# Patient Record
Sex: FEMALE | Race: WHITE | NOT HISPANIC OR LATINO | Employment: FULL TIME | URBAN - METROPOLITAN AREA
[De-identification: names, ages, dates, MRNs, and addresses within clinical notes are randomized per-mention and may not be internally consistent; named-entity substitution may affect disease eponyms.]

---

## 2019-03-15 ENCOUNTER — OFFICE VISIT (OUTPATIENT)
Dept: FAMILY MEDICINE | Facility: CLINIC | Age: 44
End: 2019-03-15
Payer: COMMERCIAL

## 2019-03-15 VITALS
DIASTOLIC BLOOD PRESSURE: 74 MMHG | SYSTOLIC BLOOD PRESSURE: 102 MMHG | WEIGHT: 264 LBS | HEIGHT: 65 IN | BODY MASS INDEX: 43.99 KG/M2 | RESPIRATION RATE: 16 BRPM | OXYGEN SATURATION: 98 % | HEART RATE: 68 BPM | TEMPERATURE: 97.5 F

## 2019-03-15 DIAGNOSIS — Z00.00 PREVENTATIVE HEALTH CARE: Primary | ICD-10-CM

## 2019-03-15 DIAGNOSIS — J30.81 ALLERGIC RHINITIS DUE TO ANIMAL HAIR AND DANDER: ICD-10-CM

## 2019-03-15 DIAGNOSIS — E66.01 MORBID OBESITY (H): ICD-10-CM

## 2019-03-15 DIAGNOSIS — Z23 NEED FOR VACCINATION: ICD-10-CM

## 2019-03-15 DIAGNOSIS — E04.2 NON-TOXIC MULTINODULAR GOITER: ICD-10-CM

## 2019-03-15 DIAGNOSIS — Z98.84 HISTORY OF ROUX-EN-Y GASTRIC BYPASS: ICD-10-CM

## 2019-03-15 DIAGNOSIS — L30.9 ECZEMA, UNSPECIFIED TYPE: ICD-10-CM

## 2019-03-15 DIAGNOSIS — J45.40 MODERATE PERSISTENT ASTHMA WITHOUT COMPLICATION: ICD-10-CM

## 2019-03-15 LAB
ALBUMIN SERPL-MCNC: 3.7 G/DL (ref 3.4–5)
ALP SERPL-CCNC: 306 U/L (ref 40–150)
ALT SERPL W P-5'-P-CCNC: 43 U/L (ref 0–50)
ANION GAP SERPL CALCULATED.3IONS-SCNC: 6 MMOL/L (ref 3–14)
AST SERPL W P-5'-P-CCNC: 51 U/L (ref 0–45)
BILIRUB SERPL-MCNC: 0.3 MG/DL (ref 0.2–1.3)
BUN SERPL-MCNC: 7 MG/DL (ref 7–30)
CALCIUM SERPL-MCNC: 8.9 MG/DL (ref 8.5–10.1)
CHLORIDE SERPL-SCNC: 107 MMOL/L (ref 94–109)
CO2 SERPL-SCNC: 26 MMOL/L (ref 20–32)
CREAT SERPL-MCNC: 0.67 MG/DL (ref 0.52–1.04)
ERYTHROCYTE [DISTWIDTH] IN BLOOD BY AUTOMATED COUNT: 15.7 % (ref 10–15)
FOLATE SERPL-MCNC: 21.3 NG/ML
GFR SERPL CREATININE-BSD FRML MDRD: >90 ML/MIN/{1.73_M2}
GLUCOSE SERPL-MCNC: 94 MG/DL (ref 70–99)
HCT VFR BLD AUTO: 36.2 % (ref 35–47)
HGB BLD-MCNC: 11.8 G/DL (ref 11.7–15.7)
IRON SATN MFR SERPL: 11 % (ref 15–46)
IRON SERPL-MCNC: 48 UG/DL (ref 35–180)
MCH RBC QN AUTO: 26.5 PG (ref 26.5–33)
MCHC RBC AUTO-ENTMCNC: 32.6 G/DL (ref 31.5–36.5)
MCV RBC AUTO: 81 FL (ref 78–100)
PLATELET # BLD AUTO: 322 10E9/L (ref 150–450)
POTASSIUM SERPL-SCNC: 3.7 MMOL/L (ref 3.4–5.3)
PROT SERPL-MCNC: 7.6 G/DL (ref 6.8–8.8)
PTH-INTACT SERPL-MCNC: 126 PG/ML (ref 18–80)
RBC # BLD AUTO: 4.46 10E12/L (ref 3.8–5.2)
SODIUM SERPL-SCNC: 139 MMOL/L (ref 133–144)
TIBC SERPL-MCNC: 456 UG/DL (ref 240–430)
TSH SERPL DL<=0.005 MIU/L-ACNC: 0.93 MU/L (ref 0.4–4)
VIT B12 SERPL-MCNC: 313 PG/ML (ref 193–986)
WBC # BLD AUTO: 6.2 10E9/L (ref 4–11)

## 2019-03-15 PROCEDURE — 83540 ASSAY OF IRON: CPT | Performed by: FAMILY MEDICINE

## 2019-03-15 PROCEDURE — 83970 ASSAY OF PARATHORMONE: CPT | Performed by: FAMILY MEDICINE

## 2019-03-15 PROCEDURE — 82607 VITAMIN B-12: CPT | Performed by: FAMILY MEDICINE

## 2019-03-15 PROCEDURE — 36415 COLL VENOUS BLD VENIPUNCTURE: CPT | Performed by: FAMILY MEDICINE

## 2019-03-15 PROCEDURE — 80053 COMPREHEN METABOLIC PANEL: CPT | Performed by: FAMILY MEDICINE

## 2019-03-15 PROCEDURE — 85027 COMPLETE CBC AUTOMATED: CPT | Performed by: FAMILY MEDICINE

## 2019-03-15 PROCEDURE — 83550 IRON BINDING TEST: CPT | Performed by: FAMILY MEDICINE

## 2019-03-15 PROCEDURE — 99213 OFFICE O/P EST LOW 20 MIN: CPT | Mod: 25 | Performed by: FAMILY MEDICINE

## 2019-03-15 PROCEDURE — 99386 PREV VISIT NEW AGE 40-64: CPT | Mod: 25 | Performed by: FAMILY MEDICINE

## 2019-03-15 PROCEDURE — 90714 TD VACC NO PRESV 7 YRS+ IM: CPT | Performed by: FAMILY MEDICINE

## 2019-03-15 PROCEDURE — 90471 IMMUNIZATION ADMIN: CPT | Performed by: FAMILY MEDICINE

## 2019-03-15 PROCEDURE — 82746 ASSAY OF FOLIC ACID SERUM: CPT | Performed by: FAMILY MEDICINE

## 2019-03-15 PROCEDURE — 84443 ASSAY THYROID STIM HORMONE: CPT | Performed by: FAMILY MEDICINE

## 2019-03-15 PROCEDURE — 82306 VITAMIN D 25 HYDROXY: CPT | Performed by: FAMILY MEDICINE

## 2019-03-15 RX ORDER — BUDESONIDE AND FORMOTEROL FUMARATE DIHYDRATE 160; 4.5 UG/1; UG/1
2 AEROSOL RESPIRATORY (INHALATION) 2 TIMES DAILY
COMMUNITY

## 2019-03-15 ASSESSMENT — ENCOUNTER SYMPTOMS
PALPITATIONS: 0
MYALGIAS: 0
HEMATURIA: 0
FEVER: 0
EYE PAIN: 0
FREQUENCY: 0
HEARTBURN: 0
DYSURIA: 0
ARTHRALGIAS: 0
DIARRHEA: 0
COUGH: 0
WEAKNESS: 0
JOINT SWELLING: 0
SORE THROAT: 0
HEMATOCHEZIA: 0
BREAST MASS: 0
NERVOUS/ANXIOUS: 0
CONSTIPATION: 0
SHORTNESS OF BREATH: 0
NAUSEA: 0
PARESTHESIAS: 0
CHILLS: 0
ABDOMINAL PAIN: 0
HEADACHES: 0

## 2019-03-15 ASSESSMENT — PAIN SCALES - GENERAL: PAINLEVEL: NO PAIN (0)

## 2019-03-15 ASSESSMENT — MIFFLIN-ST. JEOR: SCORE: 1870.52

## 2019-03-15 NOTE — PROGRESS NOTES
SUBJECTIVE:   CC: Ruth Ann Farias is an 43 year old woman who presents for preventive health visit.   Chief Complaint   Patient presents with     Physical      Feels bloated lately.   Onset of symptoms: 6 months.   Has tried to cut down on pop this week.  Used to drink a lot of Diet Pepsi.  Cutting down to one daily has seemed to help.   No pain.  No heartburn.   She has had gastric bypass.   Loose stools.  Typical pattern is 1-2 stools daily.  Past 6 months.  Has not tried medications.     Wants to lose weight this year.     Physical   Annual:     Getting at least 3 servings of Calcium per day:  NO    Bi-annual eye exam:  Yes    Dental care twice a year:  Yes    Sleep apnea or symptoms of sleep apnea:  None    Diet:  Regular (no restrictions)    Frequency of exercise:  None    Taking medications regularly:  Yes    Additional concerns today:  Yes    PHQ-2 Total Score: 0  No exercise.  Plans to start walking.     Today's PHQ-2 Score:   PHQ-2 ( 1999 Pfizer) 3/15/2019   Q1: Little interest or pleasure in doing things 0   Q2: Feeling down, depressed or hopeless 0   PHQ-2 Score 0   Q1: Little interest or pleasure in doing things Not at all   Q2: Feeling down, depressed or hopeless Not at all   PHQ-2 Score 0     Abuse: Current or Past(Physical, Sexual or Emotional)- No  Do you feel safe in your environment? No    Social History     Tobacco Use     Smoking status: Never Smoker     Smokeless tobacco: Never Used   Substance Use Topics     Alcohol use: No     Alcohol Use 3/15/2019   If you drink alcohol do you typically have greater than 3 drinks per day OR greater than 7 drinks per week? Not Applicable       History of abnormal Pap smear: Status post benign hysterectomy. Health Maintenance and Surgical History updated.    There are no active problems to display for this patient.      History   Smoking Status     Never Smoker   Smokeless Tobacco     Never Used       Family history:  CV disease: no  Breast cancer: no  Colon  "cancer: no  Ovarian/uterine/cervical cancer: no    Multivitamin or Vit D use: no    Vaccines:due for tetanus    Pregnancy history: G-1, P-1  Contraception: na  LMP: 2005  Menses: na     No results found for: PAP  Past colon cancer screen:na  Past mammogram:no       Review of Systems   Constitutional: Negative for chills and fever.   HENT: Negative for congestion, ear pain, hearing loss and sore throat.    Eyes: Negative for pain and visual disturbance.   Respiratory: Negative for cough and shortness of breath.    Cardiovascular: Negative for chest pain, palpitations and peripheral edema.   Gastrointestinal: Negative for abdominal pain, constipation, diarrhea, heartburn, hematochezia and nausea.   Breasts:  Negative for tenderness, breast mass and discharge.   Genitourinary: Negative for dysuria, frequency, genital sores, hematuria, pelvic pain, urgency, vaginal bleeding and vaginal discharge.   Musculoskeletal: Negative for arthralgias, joint swelling and myalgias.   Skin: Negative for rash.   Neurological: Negative for weakness, headaches and paresthesias.   Psychiatric/Behavioral: Negative for mood changes. The patient is not nervous/anxious.    Physical Exam  Weight up in the past year.  She thinks up 40# in the past year. Top weight was 310#.  Lowest weight was 175#.  Was about at current weight right before her surgery.     OBJECTIVE:                                                    OBJECTIVE:Blood pressure 102/74, pulse 68, temperature 97.5  F (36.4  C), temperature source Tympanic, resp. rate 16, height 1.662 m (5' 5.45\"), weight 119.7 kg (264 lb), SpO2 98 %, not currently breastfeeding. BMI=Body mass index is 43.33 kg/m .  GENERAL APPEARANCE ADULT: Alert, no acute distress, morbidly obese  EYES: PERRL, EOM normal, conjunctiva and lids normal  HENT: Ears and TMs normal, oral mucosa and posterior oropharynx normal  NECK: No adenopathy,masses or thyromegaly  RESP: lungs clear to auscultation   CV: normal " rate, regular rhythm, no murmur or gallop  ABDOMEN: soft, no organomegaly, masses or tenderness  MS: extremities normal, no peripheral edema     ASSESSMENT/PLAN:                                                    Lifestyle recommendations:regular exercise, at least 150 minutes per week (average 30 minutes 5 times a week)  keep working on losing weight (ideal BMI-body mass index is <25)  The following exams/tests were recommended and discussed for health maintenance:  Colon cancer screening beginning at age 50 if at normal risk  Mammogram screening recommendations differ among expert groups.  They may be started at age 40 or 50.  Screening can be yearly or every other year depending upon a person's risk and preference.     (Z00.00) Preventative health care  (primary encounter diagnosis)    (Z98.84) History of Maikol-en-Y gastric bypass  Comment:   Plan: CBC with platelets, Parathyroid Hormone Intact,        Vitamin D Deficiency, Folate, Vitamin B12, Iron        and iron binding capacity, Comprehensive         metabolic panel (BMP + Alb, Alk Phos, ALT, AST,        Total. Bili, TP)        Check blood tests.    (E66.01) Morbid obesity (H)  Comment:   Plan: Let us know if you would like a referral for dietician or weight loss clinic.     (J30.81) Allergic rhinitis due to animal hair and dander  Comment: dogs and cats    (J45.40) Moderate persistent asthma without complication  Comment: doing OK.    Plan: Sees allergist.   Has been doing allergy shots.     (L30.9) Eczema, unspecified type    (E04.2) Non-toxic multinodular goiter  Comment:   Plan: TSH with free T4 reflex, US Thyroid        Blood test for thyroid.   Schedule an appointment with diagnostics for an ultrasound of thyroid.   Call [phone] to schedule.       COUNSELING:  Reviewed preventive health counseling, as reflected in patient instructions  Special attention given to:        Regular exercise    BP Readings from Last 1 Encounters:   12/12/15 104/73  "    Estimated body mass index is 37.84 kg/m  as calculated from the following:    Height as of 12/12/15: 1.626 m (5' 4\").    Weight as of 12/12/15: 100 kg (220 lb 7.4 oz).      Weight management plan: Discussed healthy diet and exercise guidelines     reports that  has never smoked. she has never used smokeless tobacco.      Counseling Resources:  ATP IV Guidelines  Pooled Cohorts Equation Calculator  Breast Cancer Risk Calculator  FRAX Risk Assessment  ICSI Preventive Guidelines  Dietary Guidelines for Americans, 2010  USDA's MyPlate  ASA Prophylaxis  Lung CA Screening    Avelino Mckeon MD  [facility]  "

## 2019-03-15 NOTE — PATIENT INSTRUCTIONS
Preventive Health Recommendations  Female Ages 40 to 49    Yearly exam:     See your health care provider every year in order to  1. Review health changes.   2. Discuss preventive care.    3. Review your medicines if your doctor prescribed any.      Get a Pap test every three years (unless you have an abnormal result and your provider advises testing more often).      If you get Pap tests with HPV test, you only need to test every 5 years, unless you have an abnormal result. You do not need a Pap test if your uterus was removed (hysterectomy) and you have not had cancer.      You should be tested each year for STDs (sexually transmitted diseases), if you're at risk.     Ask your doctor if you should have a mammogram.      Have a colonoscopy (test for colon cancer) if someone in your family has had colon cancer or polyps before age 50.       Have a cholesterol test every 5 years.       Have a diabetes test (fasting glucose) after age 45. If you are at risk for diabetes, you should have this test every 3 years.    Shots: Get a flu shot each year. Get a tetanus shot every 10 years.     Nutrition:     Eat at least 5 servings of fruits and vegetables each day.    Eat whole-grain bread, whole-wheat pasta and brown rice instead of white grains and rice.    Get adequate Calcium and Vitamin D.      Lifestyle    Exercise at least 150 minutes a week (an average of 30 minutes a day, 5 days a week). This will help you control your weight and prevent disease.    Limit alcohol to one drink per day.    No smoking.     Wear sunscreen to prevent skin cancer.    See your dentist every six months for an exam and cleaning.  Preventive Health Recommendations  Female Ages 40 to 49    Yearly exam:   See your health care provider every year in order to  Review health changes.   Discuss preventive care.    Review your medicines if your doctor prescribed any.    Get a Pap test every three years (unless you have an abnormal result and your  provider advises testing more often).    If you get Pap tests with HPV test, you only need to test every 5 years, unless you have an abnormal result. You do not need a Pap test if your uterus was removed (hysterectomy) and you have not had cancer.    You should be tested each year for STDs (sexually transmitted diseases), if you're at risk.   Ask your doctor if you should have a mammogram.    Have a colonoscopy (test for colon cancer) if someone in your family has had colon cancer or polyps before age 50.     Have a cholesterol test every 5 years.     Have a diabetes test (fasting glucose) after age 45. If you are at risk for diabetes, you should have this test every 3 years.    Shots: Get a flu shot each year. Get a tetanus shot every 10 years.     Nutrition:   Eat at least 5 servings of fruits and vegetables each day.  Eat whole-grain bread, whole-wheat pasta and brown rice instead of white grains and rice.  Get adequate Calcium and Vitamin D.      Lifestyle  Exercise at least 150 minutes a week (an average of 30 minutes a day, 5 days a week). This will help you control your weight and prevent disease.  Limit alcohol to one drink per day.  No smoking.   Wear sunscreen to prevent skin cancer.  See your dentist every six months for an exam and cleaning.    ASSESSMENT/PLAN:                                                    Lifestyle recommendations:regular exercise, at least 150 minutes per week (average 30 minutes 5 times a week)  keep working on losing weight (ideal BMI-body mass index is <25)  The following exams/tests were recommended and discussed for health maintenance:  Colon cancer screening beginning at age 50 if at normal risk  Mammogram screening recommendations differ among expert groups.  They may be started at age 40 or 50.  Screening can be yearly or every other year depending upon a person's risk and preference.     (Z00.00) Preventative health care  (primary encounter diagnosis)    (Z98.84) History  of Maikol-en-Y gastric bypass  Comment:   Plan: CBC with platelets, Parathyroid Hormone Intact,        Vitamin D Deficiency, Folate, Vitamin B12, Iron        and iron binding capacity, Comprehensive         metabolic panel (BMP + Alb, Alk Phos, ALT, AST,        Total. Bili, TP)        Check blood tests.    (E66.01) Morbid obesity (H)  Comment:   Plan: Let us know if you would like a referral for dietician or weight loss clinic.     (J30.81) Allergic rhinitis due to animal hair and dander  Comment: dogs and cats    (J45.40) Moderate persistent asthma without complication  Comment: doing OK.    Plan: Sees allergist.   Has been doing allergy shots.     (L30.9) Eczema, unspecified type    (E04.2) Non-toxic multinodular goiter  Comment:   Plan: TSH with free T4 reflex, US Thyroid        Blood test for thyroid.   Schedule an appointment with diagnostics for an ultrasound of thyroid.   Call [phone] to schedule.

## 2019-03-15 NOTE — LETTER
My Asthma Action Plan  Name: Ruth Ann Farias   YOB: 1975  Date: 3/18/2019   My doctor: Avelino Mckeon MD   My clinic: [facility]        My Control Medicine: Budesonide + formoterol (Symbicort) -  160/4.5 mcg 2 puffs twice daily  My Rescue Medicine: Albuterol (Proair/Ventolin/Proventil) inhaler 2 puffs every 6 hours as needed   My Asthma Severity: moderate persistent  Avoid your asthma triggers:                GREEN ZONE   Good Control    I feel good    No cough or wheeze    Can work, sleep and play without asthma symptoms       Take your asthma control medicine every day.     1. If exercise triggers your asthma, take your rescue medication    15 minutes before exercise or sports, and    During exercise if you have asthma symptoms  2. Spacer to use with inhaler: If you have a spacer, make sure to use it with your inhaler             YELLOW ZONE Getting Worse  I have ANY of these:    I do not feel good    Cough or wheeze    Chest feels tight    Wake up at night   1. Keep taking your Green Zone medications  2. Start taking your rescue medicine:    every 20 minutes for up to 1 hour. Then every 4 hours for 24-48 hours.  3. If you stay in the Yellow Zone for more than 12-24 hours, contact your doctor.  4. If you do not return to the Green Zone in 12-24 hours or you get worse, start taking your oral steroid medicine if prescribed by your provider.           RED ZONE Medical Alert - Get Help  I have ANY of these:    I feel awful    Medicine is not helping    Breathing getting harder    Trouble walking or talking    Nose opens wide to breathe       1. Take your rescue medicine NOW  2. If your provider has prescribed an oral steroid medicine, start taking it NOW  3. Call your doctor NOW  4. If you are still in the Red Zone after 20 minutes and you have not reached your doctor:    Take your rescue medicine again and    Call 911 or go to the emergency room right away    See your regular  doctor within 2 weeks of an Emergency Room or Urgent Care visit for follow-up treatment.          Annual Reminders:  Meet with Asthma Educator,  Flu Shot in the Fall, consider Pneumonia Vaccination for patients with asthma (aged 19 and older).    Pharmacy: [facility] - [place], [place] - [address]                      Asthma Triggers  How To Control Things That Make Your Asthma Worse    Triggers are things that make your asthma worse.  Look at the list below to help you find your triggers and what you can do about them.  You can help prevent asthma flare-ups by staying away from your triggers.      Trigger                                                          What you can do   Cigarette Smoke  Tobacco smoke can make asthma worse. Do not allow smoking in your home, car or around you.  Be sure no one smokes at a child s day care or school.  If you smoke, ask your health care provider for ways to help you quit.  Ask family members to quit too.  Ask your health care provider for a referral to Quit Plan to help you quit smoking, or call 0-384-775-PLAN.     Colds, Flu, Bronchitis  These are common triggers of asthma. Wash your hands often.  Don t touch your eyes, nose or mouth.  Get a flu shot every year.     Dust Mites  These are tiny bugs that live in cloth or carpet. They are too small to see. Wash sheets and blankets in hot water every week.   Encase pillows and mattress in dust mite proof covers.  Avoid having carpet if you can. If you have carpet, vacuum weekly.   Use a dust mask and HEPA vacuum.   Pollen and Outdoor Mold  Some people are allergic to trees, grass, or weed pollen, or molds. Try to keep your windows closed.  Limit time out doors when pollen count is high.   Ask you health care provider about taking medicine during allergy season.     Animal Dander  Some people are allergic to skin flakes, urine or saliva from pets with fur or feathers. Keep pets with fur or feathers out of your  home.    If you can t keep the pet outdoors, then keep the pet out of your bedroom.  Keep the bedroom door closed.  Keep pets off cloth furniture and away from stuffed toys.     Mice, Rats, and Cockroaches  Some people are allergic to the waste from these pests.   Cover food and garbage.  Clean up spills and food crumbs.  Store grease in the refrigerator.   Keep food out of the bedroom.   Indoor Mold  This can be a trigger if your home has high moisture. Fix leaking faucets, pipes, or other sources of water.   Clean moldy surfaces.  Dehumidify basement if it is damp and smelly.   Smoke, Strong Odors, and Sprays  These can reduce air quality. Stay away from strong odors and sprays, such as perfume, powder, hair spray, paints, smoke incense, paint, cleaning products, candles and new carpet.   Exercise or Sports  Some people with asthma have this trigger. Be active!  Ask your doctor about taking medicine before sports or exercise to prevent symptoms.    Warm up for 5-10 minutes before and after sports or exercise.     Other Triggers of Asthma  Cold air:  Cover your nose and mouth with a scarf.  Sometimes laughing or crying can be a trigger.  Some medicines and food can trigger asthma.

## 2019-03-16 ASSESSMENT — ASTHMA QUESTIONNAIRES: ACT_TOTALSCORE: 25

## 2019-03-17 LAB — DEPRECATED CALCIDIOL+CALCIFEROL SERPL-MC: 8 UG/L (ref 20–75)

## 2019-03-17 NOTE — RESULT ENCOUNTER NOTE
Please call.  Vit D is low. Parathyroid hormone high-likely from the Vit D deficiency.   B12 and folate tests are normal.   Mild changes in liver tests.   Uncertain cause.   TSH is normal indicating that the level of thyroid hormone is in the normal range.   Iron appears a little low.   PLAN: I recommend daily multivitamin with iron.  Also taking Vit D 800 to 1000IU daily.  Getting some sunshine can help Vit D level.    Schedule an appointment with diagnostics for the thyroid ultrasound as planned.   Call [phone] to schedule.   Recheck Vit D in 3-6 months.

## 2019-04-10 ENCOUNTER — HOSPITAL ENCOUNTER (OUTPATIENT)
Dept: ULTRASOUND IMAGING | Facility: CLINIC | Age: 44
Discharge: HOME | End: 2019-04-10
Attending: FAMILY MEDICINE | Admitting: FAMILY MEDICINE
Payer: COMMERCIAL

## 2019-04-10 DIAGNOSIS — E04.2 NON-TOXIC MULTINODULAR GOITER: ICD-10-CM

## 2019-04-10 PROCEDURE — 76536 US EXAM OF HEAD AND NECK: CPT

## 2019-04-10 NOTE — RESULT ENCOUNTER NOTE
Anton Vallecillo,   You have three small thyroid nodules which have not changed in comparison to 2010.  PLAN: No additional testing needed.    Recheck in a couple years.   NEDRA KOEHLER MD

## 2019-04-19 ENCOUNTER — HEALTH MAINTENANCE LETTER (OUTPATIENT)
Age: 44
End: 2019-04-19

## 2019-07-10 ENCOUNTER — TELEPHONE (OUTPATIENT)
Dept: FAMILY MEDICINE | Facility: CLINIC | Age: 44
End: 2019-07-10

## 2019-07-10 NOTE — TELEPHONE ENCOUNTER
Panel Management Review      Patient has the following on her problem list:     Asthma review     ACT Total Scores 3/15/2019   ACT TOTAL SCORE (Goal Greater than or Equal to 20) 25   In the past 12 months, how many times did you visit the emergency room for your asthma without being admitted to the hospital? 0   In the past 12 months, how many times were you hospitalized overnight because of your asthma? 0      1. Is Asthma diagnosis on the Problem List? Yes    2. Is Asthma listed on Health Maintenance? Yes    3. Patient is due for:  none      Composite cancer screening  Chart review shows that this patient is due/due soon for the following Pap Smear  Summary:    Patient is due/failing the following:   Pap no longer needed    Action needed:   See above    Type of outreach:    chart updated- patient has had hysterectomy.    Questions for provider review:                                                                                                                                        Christine Buckley CMA       Chart routed to  .

## 2019-08-15 ENCOUNTER — OFFICE VISIT (OUTPATIENT)
Dept: FAMILY MEDICINE | Facility: CLINIC | Age: 44
End: 2019-08-15
Payer: COMMERCIAL

## 2019-08-15 VITALS
HEIGHT: 65 IN | TEMPERATURE: 98.2 F | WEIGHT: 244 LBS | BODY MASS INDEX: 40.65 KG/M2 | SYSTOLIC BLOOD PRESSURE: 108 MMHG | DIASTOLIC BLOOD PRESSURE: 74 MMHG | RESPIRATION RATE: 16 BRPM | OXYGEN SATURATION: 98 % | HEART RATE: 89 BPM

## 2019-08-15 DIAGNOSIS — R35.0 URINARY FREQUENCY: ICD-10-CM

## 2019-08-15 DIAGNOSIS — R82.90 NONSPECIFIC FINDING ON EXAMINATION OF URINE: ICD-10-CM

## 2019-08-15 DIAGNOSIS — N30.01 ACUTE CYSTITIS WITH HEMATURIA: Primary | ICD-10-CM

## 2019-08-15 LAB
ALBUMIN UR-MCNC: NEGATIVE MG/DL
APPEARANCE UR: CLEAR
BACTERIA #/AREA URNS HPF: ABNORMAL /HPF
BILIRUB UR QL STRIP: NEGATIVE
COLOR UR AUTO: YELLOW
GLUCOSE UR STRIP-MCNC: NEGATIVE MG/DL
HGB UR QL STRIP: ABNORMAL
KETONES UR STRIP-MCNC: NEGATIVE MG/DL
LEUKOCYTE ESTERASE UR QL STRIP: ABNORMAL
NITRATE UR QL: NEGATIVE
PH UR STRIP: 6.5 PH (ref 5–7)
RBC #/AREA URNS AUTO: ABNORMAL /HPF
SOURCE: ABNORMAL
SP GR UR STRIP: <=1.005 (ref 1–1.03)
UROBILINOGEN UR STRIP-ACNC: 0.2 EU/DL (ref 0.2–1)
WBC #/AREA URNS AUTO: ABNORMAL /HPF
WBC CLUMPS #/AREA URNS HPF: PRESENT /HPF

## 2019-08-15 PROCEDURE — 81001 URINALYSIS AUTO W/SCOPE: CPT | Performed by: NURSE PRACTITIONER

## 2019-08-15 PROCEDURE — 87088 URINE BACTERIA CULTURE: CPT | Performed by: NURSE PRACTITIONER

## 2019-08-15 PROCEDURE — 87186 SC STD MICRODIL/AGAR DIL: CPT | Performed by: NURSE PRACTITIONER

## 2019-08-15 PROCEDURE — 99213 OFFICE O/P EST LOW 20 MIN: CPT | Performed by: NURSE PRACTITIONER

## 2019-08-15 PROCEDURE — 87086 URINE CULTURE/COLONY COUNT: CPT | Performed by: NURSE PRACTITIONER

## 2019-08-15 RX ORDER — SULFAMETHOXAZOLE/TRIMETHOPRIM 800-160 MG
1 TABLET ORAL 2 TIMES DAILY
Qty: 20 TABLET | Refills: 0 | Status: SHIPPED | OUTPATIENT
Start: 2019-08-15 | End: 2019-08-25

## 2019-08-15 ASSESSMENT — MIFFLIN-ST. JEOR: SCORE: 1774.8

## 2019-08-15 NOTE — PATIENT INSTRUCTIONS
"Antibiotics as directed   Urine culture is pending, will contact you if there is a change in treatment therapy.   Drink plenty of fluids. Prevention and treatment of UTI's discussed.   Signs and symptoms of pyelonephritis mentioned.   RTC if any worsening symptoms or if not improving as expected.     Patient Education     Bladder Infection, Female (Adult)    Urine is normally doesn't have any bacteria in it. But bacteria can get into the urinary tract from the skin around the rectum. Or they can travel in the blood from elsewhere in the body. Once they are in your urinary tract, they can cause infection in the urethra (urethritis), the bladder (cystitis), or the kidneys (pyelonephritis).  The most common place for an infection is in the bladder. This is called a bladder infection. This is one of the most common infections in women. Most bladder infections are easily treated. They are not serious unless the infection spreads to the kidney.  The phrases \"bladder infection,\" \"UTI,\" and \"cystitis\" are often used to describe the same thing. But they are not always the same. Cystitis is an inflammation of the bladder. The most common cause of cystitis is an infection.  Symptoms  The infection causes inflammation in the urethra and bladder. This causes many of the symptoms. The most common symptoms of a bladder infection are:    Pain or burning when urinating    Having to urinate more often than usual    Urgent need to urinate    Only a small amount of urine comes out    Blood in urine    Abdominal discomfort. This is usually in the lower abdomen above the pubic bone.    Cloudy urine    Strong- or bad-smelling urine    Unable to urinate (urinary retention)    Unable to hold urine in (urinary incontinence)    Fever    Loss of appetite    Confusion (in older adults)  Causes  Bladder infections are not contagious. You can't get one from someone else, from a toilet seat, or from sharing a bath.  The most common cause of " bladder infections is bacteria from the bowels. The bacteria get onto the skin around the opening of the urethra. From there, they can get into the urine and travel up to the bladder, causing inflammation and infection. This usually happens because of:    Wiping improperly after urinating. Always wipe from front to back.    Bowel incontinence    Pregnancy    Procedures such as having a catheter inserted    Older age    Not emptying your bladder. This can allow bacteria a chance to grow in your urine.    Dehydration    Constipation    Sex    Use of a diaphragm for birth control   Treatment  Bladder infections are diagnosed by a urine test. They are treated with antibiotics and usually clear up quickly without complications. Treatment helps prevent a more serious kidney infection.  Medicines  Medicines can help in the treatment of a bladder infection:    Take antibiotics until they are used up, even if you feel better. It is important to finish them to make sure the infection has cleared.    You can use acetaminophen or ibuprofen for pain, fever, or discomfort, unless another medicine was prescribed. If you have chronic liver or kidney disease, talk with your healthcare provider before using these medicines. Also talk with your provider if you've ever had a stomach ulcer or gastrointestinal bleeding, or are taking blood-thinner medicines.    If you are given phenazopydridine to reduce burning with urination, it will cause your urine to become a bright orange color. This can stain clothing.  Care and prevention  These self-care steps can help prevent future infections:    Drink plenty of fluids to prevent dehydration and flush out your bladder. Do this unless you must restrict fluids for other health reasons, or your doctor told you not to.    Proper cleaning after going to the bathroom is important. Wipe from front to back after using the toilet to prevent the spread of bacteria.    Urinate more often. Don't try to  hold urine in for a long time.    Wear loose-fitting clothes and cotton underwear. Avoid tight-fitting pants.    Improve your diet and prevent constipation. Eat more fresh fruit and vegetables, and fiber, and less junk and fatty foods.    Avoid sex until your symptoms are gone.    Avoid caffeine, alcohol, and spicy foods. These can irritate your bladder.    Urinate right after intercourse to flush out your bladder.    If you use birth control pills and have frequent bladder infections, discuss it with your doctor.  Follow-up care  Call your healthcare provider if all symptoms are not gone after 3 days of treatment. This is especially important if you have repeat infections.  If a culture was done, you will be told if your treatment needs to be changed. If directed, you can call to find out the results.  If X-rays were done, you will be told if the results will affect your treatment.  Call 911  Call 911 if any of the following occur:    Trouble breathing    Hard to wake up or confusion    Fainting or loss of consciousness    Rapid heart rate  When to seek medical advice  Call your healthcare provider right away if any of these occur:    Fever of 100.4 F (38.0 C) or higher, or as directed by your healthcare provider    Symptoms are not better by the third day of treatment    Back or belly (abdominal) pain that gets worse    Repeated vomiting, or unable to keep medicine down    Weakness or dizziness    Vaginal discharge    Pain, redness, or swelling in the outer vaginal area (labia)  Date Last Reviewed: 10/1/2016    2423-3413 [employer]. [address] [place] [address], [place] [address]. All rights reserved. This information is not intended as a substitute for professional medical care. Always follow your healthcare professional's instructions.

## 2019-08-15 NOTE — NURSING NOTE
"Chief Complaint   Patient presents with     UTI       Initial /74   Pulse 89   Temp 98.2  F (36.8  C) (Tympanic)   Resp 16   Ht 1.662 m (5' 5.45\")   Wt 110.7 kg (244 lb)   SpO2 98%   BMI 40.05 kg/m   Estimated body mass index is 40.05 kg/m  as calculated from the following:    Height as of this encounter: 1.662 m (5' 5.45\").    Weight as of this encounter: 110.7 kg (244 lb).    Patient presents to the clinic using No DME    Health Maintenance that is potentially due pending provider review:  NONE    n/a    Is there anyone who you would like to be able to receive your results? No  If yes have patient fill out RONDA      " Paged with correct number but incorrect fun number.    Patient reports that she has been on antibiotics for a prolonged course.    Developed itching in vulva but no discharge yet.    Wanted advice on how to proceed, she suspects a yeast infection.    Advised she can try OTC monistat but if would like to be evaluated, to come to [facility] today or [facility] tomorrow.  Reviewed other possible etiologies and how a vaginal swab could assist in diagnosis.    Patient agreeable to plan.

## 2019-08-15 NOTE — PROGRESS NOTES
Subjective     Ruth Ann Farias is a 44 year old female who presents to clinic today for the following health issues:    HPI   URINARY TRACT SYMPTOMS      Duration: yesterday     Description  dysuria, frequency and hematuria    Intensity:  moderate    Accompanying signs and symptoms:  Fever/chills: no   Flank pain no   Nausea and vomiting: no   Vaginal symptoms: none  Abdominal/Pelvic Pain: no     History  History of frequent UTI's: YES- used to get them five times a year. Has not had a UTI in 2 years. Seen urology in the past.   History of kidney stones: no   Sexually Active: YES  Possibility of pregnancy: No    Precipitating or alleviating factors: None    Therapies tried and outcome: none           Patient Active Problem List   Diagnosis     History of Maikol-en-Y gastric bypass     Morbid obesity (H)     Allergic rhinitis due to animal hair and dander     Non-toxic multinodular goiter     Moderate persistent asthma without complication     Eczema, unspecified type     Past Surgical History:   Procedure Laterality Date     GASTRIC BYPASS  2005     HYSTERECTOMY      dysmenorrhea and neavy periods.         Social History     Tobacco Use     Smoking status: Never Smoker     Smokeless tobacco: Never Used   Substance Use Topics     Alcohol use: No     Family History   Problem Relation Age of Onset     Diabetes Mother 60        passed from diabetic complecations     Mental Illness Mother      Obesity Mother      Obesity Father      Obesity Maternal Grandmother      Obesity Maternal Grandfather      Heart Disease Sister      Thyroid Disease Sister      Obesity Sister      Obesity Daughter      Colon Cancer No family hx of      Breast Cancer No family hx of      Coronary Artery Disease No family hx of          Current Outpatient Medications   Medication Sig Dispense Refill     albuterol (PROAIR HFA, PROVENTIL HFA, VENTOLIN HFA) 108 (90 BASE) MCG/ACT inhaler Inhale 2 puffs into the lungs every 6 hours        "budesonide-formoterol (SYMBICORT) 160-4.5 MCG/ACT Inhaler Inhale 2 puffs into the lungs 2 times daily       sulfamethoxazole-trimethoprim (BACTRIM DS/SEPTRA DS) 800-160 MG tablet Take 1 tablet by mouth 2 times daily for 10 days 20 tablet 0     Allergies   Allergen Reactions     Nitrofurantoin Difficulty breathing         Reviewed and updated as needed this visit by Provider  Tobacco  Allergies  Meds  Problems  Med Hx  Surg Hx  Fam Hx         Review of Systems   ROS COMP: Constitutional, HEENT, cardiovascular, pulmonary, GI, , musculoskeletal, neuro, skin, endocrine and psych systems are negative, except as otherwise noted.      Objective    /74   Pulse 89   Temp 98.2  F (36.8  C) (Tympanic)   Resp 16   Ht 1.662 m (5' 5.45\")   Wt 110.7 kg (244 lb)   SpO2 98%   BMI 40.05 kg/m    Body mass index is 40.05 kg/m .  Physical Exam   GENERAL: healthy, alert and no distress, nontoxic in appearance  EYES: Eyes grossly normal to inspection, PERRL and conjunctivae and sclerae normal  HENT: normocephalic  NECK:supple with full ROM  RESP: lungs clear to auscultation - no rales, rhonchi or wheezes  CV: regular rate and rhythm, normal S1 S2, no S3 or S4, no murmur, click or rub, no peripheral edema   ABDOMEN: soft, mild suprapubic tenderness, no hepatosplenomegaly, no masses and bowel sounds normal  MS: no gross musculoskeletal defects noted, no edema  Neb CVA tenderness. No rash    Diagnostic Test Results:  Labs reviewed in Epic  Results for orders placed or performed in visit on 08/15/19 (from the past 24 hour(s))   *UA reflex to Microscopic and Culture ([place] [facility] (except [place] and [place])   Result Value Ref Range    Color Urine Yellow     Appearance Urine Clear     Glucose Urine Negative NEG^Negative mg/dL    Bilirubin Urine Negative NEG^Negative    Ketones Urine Negative NEG^Negative mg/dL    Specific Gravity Urine <=1.005 1.003 - 1.035    Blood Urine Large (A) NEG^Negative    pH " "Urine 6.5 5.0 - 7.0 pH    Protein Albumin Urine Negative NEG^Negative mg/dL    Urobilinogen Urine 0.2 0.2 - 1.0 EU/dL    Nitrite Urine Negative NEG^Negative    Leukocyte Esterase Urine Moderate (A) NEG^Negative    Source Midstream Urine    Urine Microscopic   Result Value Ref Range    WBC Urine 10-25 (A) OTO5^0 - 5 /HPF    RBC Urine 2-5 (A) OTO2^O - 2 /HPF    WBC Clumps Present (A) NEG^Negative /HPF    Bacteria Urine Moderate (A) NEG^Negative /HPF           Assessment & Plan   Problem List Items Addressed This Visit     None      Visit Diagnoses     Acute cystitis with hematuria    -  Primary    Relevant Medications    sulfamethoxazole-trimethoprim (BACTRIM DS/SEPTRA DS) 800-160 MG tablet    Urinary frequency        Relevant Orders    *UA reflex to Microscopic and Culture ([place] [facility] (except [place] and [place]) (Completed)    Urine Microscopic (Completed)    Nonspecific finding on examination of urine        Relevant Orders    Urine Culture Aerobic Bacterial (Completed)             BMI:   Estimated body mass index is 40.05 kg/m  as calculated from the following:    Height as of this encounter: 1.662 m (5' 5.45\").    Weight as of this encounter: 110.7 kg (244 lb).   Weight management plan: Patient was referred to their PCP to discuss a diet and exercise plan.        Patient Instructions   Antibiotics as directed   Urine culture is pending, will contact you if there is a change in treatment therapy.   Drink plenty of fluids. Prevention and treatment of UTI's discussed.   Signs and symptoms of pyelonephritis mentioned.   RTC if any worsening symptoms or if not improving as expected.     Patient Education     Bladder Infection, Female (Adult)    Urine is normally doesn't have any bacteria in it. But bacteria can get into the urinary tract from the skin around the rectum. Or they can travel in the blood from elsewhere in the body. Once they are in your urinary tract, they can cause infection in the " "urethra (urethritis), the bladder (cystitis), or the kidneys (pyelonephritis).  The most common place for an infection is in the bladder. This is called a bladder infection. This is one of the most common infections in women. Most bladder infections are easily treated. They are not serious unless the infection spreads to the kidney.  The phrases \"bladder infection,\" \"UTI,\" and \"cystitis\" are often used to describe the same thing. But they are not always the same. Cystitis is an inflammation of the bladder. The most common cause of cystitis is an infection.  Symptoms  The infection causes inflammation in the urethra and bladder. This causes many of the symptoms. The most common symptoms of a bladder infection are:    Pain or burning when urinating    Having to urinate more often than usual    Urgent need to urinate    Only a small amount of urine comes out    Blood in urine    Abdominal discomfort. This is usually in the lower abdomen above the pubic bone.    Cloudy urine    Strong- or bad-smelling urine    Unable to urinate (urinary retention)    Unable to hold urine in (urinary incontinence)    Fever    Loss of appetite    Confusion (in older adults)  Causes  Bladder infections are not contagious. You can't get one from someone else, from a toilet seat, or from sharing a bath.  The most common cause of bladder infections is bacteria from the bowels. The bacteria get onto the skin around the opening of the urethra. From there, they can get into the urine and travel up to the bladder, causing inflammation and infection. This usually happens because of:    Wiping improperly after urinating. Always wipe from front to back.    Bowel incontinence    Pregnancy    Procedures such as having a catheter inserted    Older age    Not emptying your bladder. This can allow bacteria a chance to grow in your urine.    Dehydration    Constipation    Sex    Use of a diaphragm for birth control   Treatment  Bladder infections are " diagnosed by a urine test. They are treated with antibiotics and usually clear up quickly without complications. Treatment helps prevent a more serious kidney infection.  Medicines  Medicines can help in the treatment of a bladder infection:    Take antibiotics until they are used up, even if you feel better. It is important to finish them to make sure the infection has cleared.    You can use acetaminophen or ibuprofen for pain, fever, or discomfort, unless another medicine was prescribed. If you have chronic liver or kidney disease, talk with your healthcare provider before using these medicines. Also talk with your provider if you've ever had a stomach ulcer or gastrointestinal bleeding, or are taking blood-thinner medicines.    If you are given phenazopydridine to reduce burning with urination, it will cause your urine to become a bright orange color. This can stain clothing.  Care and prevention  These self-care steps can help prevent future infections:    Drink plenty of fluids to prevent dehydration and flush out your bladder. Do this unless you must restrict fluids for other health reasons, or your doctor told you not to.    Proper cleaning after going to the bathroom is important. Wipe from front to back after using the toilet to prevent the spread of bacteria.    Urinate more often. Don't try to hold urine in for a long time.    Wear loose-fitting clothes and cotton underwear. Avoid tight-fitting pants.    Improve your diet and prevent constipation. Eat more fresh fruit and vegetables, and fiber, and less junk and fatty foods.    Avoid sex until your symptoms are gone.    Avoid caffeine, alcohol, and spicy foods. These can irritate your bladder.    Urinate right after intercourse to flush out your bladder.    If you use birth control pills and have frequent bladder infections, discuss it with your doctor.  Follow-up care  Call your healthcare provider if all symptoms are not gone after 3 days of treatment.  This is especially important if you have repeat infections.  If a culture was done, you will be told if your treatment needs to be changed. If directed, you can call to find out the results.  If X-rays were done, you will be told if the results will affect your treatment.  Call 911  Call 911 if any of the following occur:    Trouble breathing    Hard to wake up or confusion    Fainting or loss of consciousness    Rapid heart rate  When to seek medical advice  Call your healthcare provider right away if any of these occur:    Fever of 100.4 F (38.0 C) or higher, or as directed by your healthcare provider    Symptoms are not better by the third day of treatment    Back or belly (abdominal) pain that gets worse    Repeated vomiting, or unable to keep medicine down    Weakness or dizziness    Vaginal discharge    Pain, redness, or swelling in the outer vaginal area (labia)  Date Last Reviewed: 10/1/2016    0636-9254 [employer]. [address] [place] [address]. All rights reserved. This information is not intended as a substitute for professional medical care. Always follow your healthcare professional's instructions.             Return in about 10 days (around 8/25/2019), or if symptoms worsen or fail to improve, for Follow up with your primary care provider.    DEJAN Agarwal [facility]

## 2019-08-16 LAB
BACTERIA SPEC CULT: ABNORMAL
Lab: ABNORMAL
SPECIMEN SOURCE: ABNORMAL

## 2019-11-03 ENCOUNTER — HEALTH MAINTENANCE LETTER (OUTPATIENT)
Age: 44
End: 2019-11-03

## 2020-04-17 ENCOUNTER — TELEPHONE (OUTPATIENT)
Dept: FAMILY MEDICINE | Facility: CLINIC | Age: 45
End: 2020-04-17

## 2020-04-17 NOTE — LETTER
My Asthma Action Plan    Name: Ruth Ann Farias   YOB: 1975  Date: 4/17/2020   My doctor: Avelino Mckeon MD   My clinic: [facility]        My Control Medicine: Budesonide + formoterol (Symbicort HFA) -  160/4.5 mcg 2 puffs twice daily  My Rescue Medicine: Albuterol (Proair/Ventolin/Proventil HFA) 2-4 puffs EVERY 4 HOURS as needed. Use a spacer if recommended by your provider.   My Asthma Severity:   Moderate Persistent  Know your asthma triggers:                GREEN ZONE   Good Control    I feel good    No cough or wheeze    Can work, sleep and play without asthma symptoms       Take your asthma control medicine every day.     1. If exercise triggers your asthma, take your rescue medication    15 minutes before exercise or sports, and    During exercise if you have asthma symptoms  2. Spacer to use with inhaler: If you have a spacer, make sure to use it with your inhaler             YELLOW ZONE Getting Worse  I have ANY of these:    I do not feel good    Cough or wheeze    Chest feels tight    Wake up at night   1. Keep taking your Green Zone medications  2. Start taking your rescue medicine:    every 20 minutes for up to 1 hour. Then every 4 hours for 24-48 hours.  3. If you stay in the Yellow Zone for more than 12-24 hours, contact your doctor.  4. If you do not return to the Green Zone in 12-24 hours or you get worse, start taking your oral steroid medicine if prescribed by your provider.           RED ZONE Medical Alert - Get Help  I have ANY of these:    I feel awful    Medicine is not helping    Breathing getting harder    Trouble walking or talking    Nose opens wide to breathe       1. Take your rescue medicine NOW  2. If your provider has prescribed an oral steroid medicine, start taking it NOW  3. Call your doctor NOW  4. If you are still in the Red Zone after 20 minutes and you have not reached your doctor:    Take your rescue medicine again and    Call 911 or go to the  emergency room right away    See your regular doctor within 2 weeks of an Emergency Room or Urgent Care visit for follow-up treatment.          Annual Reminders:  Meet with Asthma Educator,  Flu Shot in the Fall, consider Pneumonia Vaccination for patients with asthma (aged 19 and older).    Pharmacy: [facility] [place], [place] - [address]    Electronically signed by Avelino Mckeon MD   Date: 04/17/20                      Asthma Triggers  How To Control Things That Make Your Asthma Worse    Triggers are things that make your asthma worse.  Look at the list below to help you find your triggers and what you can do about them.  You can help prevent asthma flare-ups by staying away from your triggers.      Trigger                                                          What you can do   Cigarette Smoke  Tobacco smoke can make asthma worse. Do not allow smoking in your home, car or around you.  Be sure no one smokes at a child s day care or school.  If you smoke, ask your health care provider for ways to help you quit.  Ask family members to quit too.  Ask your health care provider for a referral to Quit Plan to help you quit smoking, or call 0-568-051-PLAN.     Colds, Flu, Bronchitis  These are common triggers of asthma. Wash your hands often.  Don t touch your eyes, nose or mouth.  Get a flu shot every year.     Dust Mites  These are tiny bugs that live in cloth or carpet. They are too small to see. Wash sheets and blankets in hot water every week.   Encase pillows and mattress in dust mite proof covers.  Avoid having carpet if you can. If you have carpet, vacuum weekly.   Use a dust mask and HEPA vacuum.   Pollen and Outdoor Mold  Some people are allergic to trees, grass, or weed pollen, or molds. Try to keep your windows closed.  Limit time out doors when pollen count is high.   Ask you health care provider about taking medicine during allergy season.     Animal Dander  Some people are  allergic to skin flakes, urine or saliva from pets with fur or feathers. Keep pets with fur or feathers out of your home.    If you can t keep the pet outdoors, then keep the pet out of your bedroom.  Keep the bedroom door closed.  Keep pets off cloth furniture and away from stuffed toys.     Mice, Rats, and Cockroaches   Some people are allergic to the waste from these pests.   Cover food and garbage.  Clean up spills and food crumbs.  Store grease in the refrigerator.   Keep food out of the bedroom.   Indoor Mold  This can be a trigger if your home has high moisture. Fix leaking faucets, pipes, or other sources of water.   Clean moldy surfaces.  Dehumidify basement if it is damp and smelly.   Smoke, Strong Odors, and Sprays  These can reduce air quality. Stay away from strong odors and sprays, such as perfume, powder, hair spray, paints, smoke incense, paint, cleaning products, candles and new carpet.   Exercise or Sports  Some people with asthma have this trigger. Be active!  Ask your doctor about taking medicine before sports or exercise to prevent symptoms.    Warm up for 5-10 minutes before and after sports or exercise.     Other Triggers of Asthma  Cold air:  Cover your nose and mouth with a scarf.  Sometimes laughing or crying can be a trigger.  Some medicines and food can trigger asthma.

## 2020-04-17 NOTE — TELEPHONE ENCOUNTER
Panel Management Review      Patient has the following on her problem list:     Asthma review     ACT Total Scores 4/17/2020   ACT TOTAL SCORE (Goal Greater than or Equal to 20) 24   In the past 12 months, how many times did you visit the emergency room for your asthma without being admitted to the hospital? 0   In the past 12 months, how many times were you hospitalized overnight because of your asthma? 0      1. Is Asthma diagnosis on the Problem List? Yes    2. Is Asthma listed on Health Maintenance? Yes    3. Patient is due for:  ACT and AAP      Composite cancer screening  Chart review shows that this patient is due/due soon for the following   Summary:    Patient is due/failing the following:   AAP and ACT    Action needed:   Patient needs to do ACT. Patient had copy of ACT- so completed over the phone.  Patient has seen allergist and Ruben jewell in the past and currently not taking Symbicort everyday.  I suggested she reach out to her doctor and ask about this, especially with COVID concerns.   Type of outreach:    Phone, spoke to patient.  see above    Questions for provider review:    None                                                                                                                                    Christine Buckley CMA       Chart routed to  .

## 2020-04-18 ASSESSMENT — ASTHMA QUESTIONNAIRES: ACT_TOTALSCORE: 24

## 2020-11-16 ENCOUNTER — HEALTH MAINTENANCE LETTER (OUTPATIENT)
Age: 45
End: 2020-11-16

## 2021-05-28 ENCOUNTER — RECORDS - HEALTHEAST (OUTPATIENT)
Dept: ADMINISTRATIVE | Facility: CLINIC | Age: 46
End: 2021-05-28

## 2021-06-14 ENCOUNTER — MEDICAL CORRESPONDENCE (OUTPATIENT)
Dept: HEALTH INFORMATION MANAGEMENT | Facility: CLINIC | Age: 46
End: 2021-06-14

## 2021-07-12 ENCOUNTER — HOSPITAL ENCOUNTER (OUTPATIENT)
Dept: PHYSICAL THERAPY | Facility: CLINIC | Age: 46
Setting detail: THERAPIES SERIES
End: 2021-07-12
Attending: DENTIST
Payer: COMMERCIAL

## 2021-07-12 PROCEDURE — 97110 THERAPEUTIC EXERCISES: CPT | Mod: GP | Performed by: PHYSICAL THERAPIST

## 2021-07-12 PROCEDURE — 97033 APP MDLTY 1+IONTPHRSIS EA 15: CPT | Mod: GP | Performed by: PHYSICAL THERAPIST

## 2021-07-12 PROCEDURE — 97162 PT EVAL MOD COMPLEX 30 MIN: CPT | Mod: GP | Performed by: PHYSICAL THERAPIST

## 2021-07-12 NOTE — PROGRESS NOTES
TMJ Evaluation 07/12/21 0800   General Information   Type of Visit Initial OP Ortho PT Evaluation   Start of Care Date 07/12/21   Referring Physician Ray Feldman    Patient/Family Goals Statement MTUS [place]   Orders Evaluate and Treat   Orders Comment Modalities as needed. Return after 4 if no change.    Date of Order 06/14/21   Certification Required? No  (BCBS - Auth'd )   Medical Diagnosis B TMJ Internal Derangement,  L TMJ Pain.    Surgical/Medical history reviewed   (Asthma, Gastric Bypass, Hysterectomy )   Precautions/Limitations   (Softer diet. )   Body Part(s)   Body Part(s) TMJ   Presentation and Etiology   Pertinent history of current problem (include personal factors and/or comorbidities that impact the POC) Long appointment for dental work, had something in mouth to help w/ opening, and was fighting it during that time. Was in November of 2020.    Impairments A. Pain;M. Locking or catching   Functional Limitations Other  (Oral functions, Eating, Yawning. )   Symptom Location Pain L in front of ear that shoots into ear. No HA 's. Catches when opening and has to force it open.   How/Where did it occur Other  (Dental )   Onset date of current episode/exacerbation 06/14/21  (MD order date, but dental appointment was in November 2020 )   Chronicity Chronic   Pain rating (0-10 point scale)   (2-4/10 )   Pain quality A. Sharp;B. Dull   Frequency of pain/symptoms C. With activity   Pain/symptoms are: Worse during the day  (Later in day. )   Pain/symptoms exacerbated by M. Other   Pain exacerbation comment EAting, Yawning   Pain/symptoms eased by C. Rest   Progression of symptoms since onset: Improved   Prior Level of Function   Functional Level Prior Comment In[employer] Jet ingram   Current Level of Function   Current Community Support Family/friend caregiver   Patient role/employment history A. Employed   Employment Comments [employer]   [facility] [place]   Fall Risk Screen    Fall screen completed by PT   Have you fallen 2 or more times in the past year? No   Have you fallen and had an injury in the past year? No   Timed Up and Go score (seconds) No balance issues and walks quickly into dept.    Is patient a fall risk? No   Abuse Screen (yes response referral indicated)   Feels Unsafe at Home or Work/School no   Feels Threatened by Someone no   Does Anyone Try to Keep You From Having Contact with Others or Doing Things Outside Your Home? no   Physical Signs of Abuse Present no   System Outcome Measures   Outcome Measures   (TMJ FUnctional scale scanned into chart. )   Functional Scales   Functional Scales OPTIMAL   Optimal (1=able to do without difficulty, 2=able to do with little difficulty, 3=able to do with moderate difficulty, 4=able to do with much difficulty, 5=unable to do, 9=NA) Activity 1;Activity 3;Activity 2   Activity 1 comment Yawning 8/10 on TMJ Scale.    Activity 2 comment Opening Wide enought to bite into sandwich 9/10 on TMJ Scal.e    Activity 3 comment Chewing Hard bread 8/10 on TMJ functional scale.    TMJ Objective Findings   Observation No acute distress    Integumentary  Normal    Posture head forward, Flat thoracic back, rounded shoulders.    Joint noises Yes with pain   Joint lock Closed   Pain Yawning;Chewing;Opening;Brushing, flossing   Associated symptoms Earache   Headache None   Habits Unilateral chewing;Caffeine   Tongue position TATU taught today.    Difficulty swallowing No   Muscal Ridging Yes   Tongue Scalloping No   Accelerated Tooth Wear No   Overjet (mm) 2   Overbite (mm) 3   Intraoral mouth appliance No   Bite/Occlusion Bite does not feel off   Major dental work Yes   Tired or painful jaw muscles Yes   Opening click Yes   Closing click Yes   Crepitus No   Subluxation No   Early translation Yes  (L>R )   Passive testing - Distraction Hypomobile  (Bilateral )   TMJ ROM Mandible Opening;Mandible Protrusion;Left Laterotrusion;Right Laterotrusion   TMJ  ROM Comments Tight SC space, Slight deviation during opening    Palpation   (No tender of Muscles, Scalenes Tight B, No capsule tendernes)   Dental work comment Last in May of 2021.    Opening click comment End range but range very limited.    Mandible Opening 15   Mandible Protrusion 4   Left Laterotrusion 5   Right Laterotrusion 4   Translation comment L>R    Planned Therapy Interventions   Planned Therapy Interventions Comment Develop TMJ Ex program, Postural restoration. H/O's given today.    Planned Modality Interventions   Planned Modality Interventions Comments US, Iontophoresis, Infrared.    Clinical Impression   Criteria for Skilled Therapeutic Interventions Met yes, treatment indicated   PT Diagnosis Disc Derangement L>R TMJ    Influenced by the following impairments Pain, Clicking, limited ROM    Functional limitations due to impairments Eating, Yawning   Clinical Presentation Evolving/Changing   Clinical Presentation Rationale TMJ Functional Scale, TMJ ROM, Posture, Hx of Asthma, Palpation,- Click.    Clinical Decision Making (Complexity) Moderate complexity   Therapy Frequency 2 times/Week   Predicted Duration of Therapy Intervention (days/wks) 5 weeks, as needed, 10 visits.    Risk & Benefits of therapy have been explained Yes   Patient, Family & other staff in agreement with plan of care Yes   Clinical Impression Comments Disc Derangement L>R TMJ    Education Assessment   Preferred Learning Style Listening;Demonstration;Pictures/video   Barriers to Learning No barriers   ORTHO GOALS   PT Ortho Eval Goals 1;2;3;4   Ortho Goal 1   Goal Identifier 1   Goal Description STG: Improve opening to 30 mm for better ability to bite into sandwich.    Target Date 08/12/21   Ortho Goal 2   Goal Identifier 2   Goal Description STG: Pt will be able to Yawn 4/10 on TMJ functional scale    Target Date 08/12/21   Ortho Goal 3   Goal Identifier 3   Goal Description STG: Pt will be able to chew hard bread 4/10 on  functional scale.    Target Date 08/12/21   Ortho Goal 4   Goal Identifier 4   Goal Description LTG: Pt will be independent w/ HEP and self cares to improve TMJ function toward normal.    Target Date 08/26/21   Total Evaluation Time   PT Simón, Moderate Complexity Minutes (88373) 30   Venita Funk, PT, [facility] (#[id number])  [facility]           [phone]  Fax          [phone]  Appt #      [phone]

## 2021-07-21 ENCOUNTER — HOSPITAL ENCOUNTER (OUTPATIENT)
Dept: PHYSICAL THERAPY | Facility: CLINIC | Age: 46
Setting detail: THERAPIES SERIES
End: 2021-07-21
Attending: DENTIST
Payer: COMMERCIAL

## 2021-07-21 PROCEDURE — 97110 THERAPEUTIC EXERCISES: CPT | Mod: GP | Performed by: PHYSICAL THERAPIST

## 2021-07-21 PROCEDURE — 97033 APP MDLTY 1+IONTPHRSIS EA 15: CPT | Mod: GP | Performed by: PHYSICAL THERAPIST

## 2021-07-26 ENCOUNTER — HOSPITAL ENCOUNTER (OUTPATIENT)
Dept: PHYSICAL THERAPY | Facility: CLINIC | Age: 46
Setting detail: THERAPIES SERIES
End: 2021-07-26
Attending: DENTIST
Payer: COMMERCIAL

## 2021-07-26 PROCEDURE — 97140 MANUAL THERAPY 1/> REGIONS: CPT | Mod: GP | Performed by: PHYSICAL THERAPIST

## 2021-07-26 PROCEDURE — 97033 APP MDLTY 1+IONTPHRSIS EA 15: CPT | Mod: GP | Performed by: PHYSICAL THERAPIST

## 2021-07-28 ENCOUNTER — HOSPITAL ENCOUNTER (OUTPATIENT)
Dept: PHYSICAL THERAPY | Facility: CLINIC | Age: 46
Setting detail: THERAPIES SERIES
End: 2021-07-28
Attending: DENTIST
Payer: COMMERCIAL

## 2021-07-28 PROCEDURE — 97140 MANUAL THERAPY 1/> REGIONS: CPT | Mod: GP | Performed by: PHYSICAL THERAPIST

## 2021-07-28 PROCEDURE — 97033 APP MDLTY 1+IONTPHRSIS EA 15: CPT | Mod: GP | Performed by: PHYSICAL THERAPIST

## 2021-08-02 ENCOUNTER — HOSPITAL ENCOUNTER (OUTPATIENT)
Dept: PHYSICAL THERAPY | Facility: CLINIC | Age: 46
Setting detail: THERAPIES SERIES
End: 2021-08-02
Attending: DENTIST
Payer: COMMERCIAL

## 2021-08-02 PROCEDURE — 97033 APP MDLTY 1+IONTPHRSIS EA 15: CPT | Mod: GP | Performed by: PHYSICAL THERAPIST

## 2021-08-02 PROCEDURE — 97140 MANUAL THERAPY 1/> REGIONS: CPT | Mod: GP | Performed by: PHYSICAL THERAPIST

## 2021-09-18 ENCOUNTER — HEALTH MAINTENANCE LETTER (OUTPATIENT)
Age: 46
End: 2021-09-18

## 2021-10-27 NOTE — PROGRESS NOTES
Outpatient Physical Therapy Discharge Note     Patient: Ruth Ann Farias  : 1975    Beginning/End Dates of Reporting Period:  21 to 21 when last seen. Total # of rx sessions: 5    Referring Provider: Ray Feldman Diagnosis: B TMJ Internal Derangement. L TMJ Pain.      Client Self Report: Doing exercises correctly now.     Objective Measurements:  Objective Measure: TMJ Functional lScale  Details: Scanned into epic     Objective Measure: TMJ ROM   Details: 21  Opening 19. INITIALlY:  Opening 15 mm, Protrusion 4, L Laterotrusion 5, R Laterotrusion 4 mm.     Objective Measure: Palpation   Details: Poor Posture, No tenderness, but Tight B Scalens     Goals:  Goal Identifier 1   Goal Description STG: Improve opening to 30 mm for better ability to bite into sandwich.    Target Date 21   Date Met      Progress (detail required for progress note):       Goal Identifier 2   Goal Description STG: Pt will be able to Yawn 4/10 on TMJ functional scale    Target Date 21   Date Met      Progress (detail required for progress note): A little easeir.      Goal Identifier 3   Goal Description STG: Pt will be able to chew hard bread 4/10 on functional scale.    Target Date 21   Date Met      Progress (detail required for progress note): Haven't tried it.      Goal Identifier 4   Goal Description LTG: Pt will be independent w/ HEP and self cares to improve TMJ function toward normal.    Target Date 21   Date Met      Progress (detail required for progress note):       Plan:  Discharge from therapy.    Discharge:    Reason for Discharge: Patient has failed to schedule further appointments.  Poor response to therapy.     Equipment Issued:      Discharge Plan: Patient to continue home program. Pt to follow up w/MD as appropriate.   Venita Funk, PT, [facility] (#[id number])  [facility]           [phone]  Fax          [phone]  Appt #       [phone]

## 2022-01-08 ENCOUNTER — HEALTH MAINTENANCE LETTER (OUTPATIENT)
Age: 47
End: 2022-01-08

## 2022-06-19 ENCOUNTER — HEALTH MAINTENANCE LETTER (OUTPATIENT)
Age: 47
End: 2022-06-19

## 2022-11-19 ENCOUNTER — HEALTH MAINTENANCE LETTER (OUTPATIENT)
Age: 47
End: 2022-11-19

## 2023-04-09 ENCOUNTER — HEALTH MAINTENANCE LETTER (OUTPATIENT)
Age: 48
End: 2023-04-09

## 2023-06-23 ENCOUNTER — OFFICE VISIT (OUTPATIENT)
Dept: URGENT CARE | Facility: URGENT CARE | Age: 48
End: 2023-06-23
Payer: COMMERCIAL

## 2023-06-23 VITALS
TEMPERATURE: 98.5 F | SYSTOLIC BLOOD PRESSURE: 122 MMHG | OXYGEN SATURATION: 98 % | HEART RATE: 71 BPM | DIASTOLIC BLOOD PRESSURE: 81 MMHG | RESPIRATION RATE: 16 BRPM

## 2023-06-23 DIAGNOSIS — H10.9 BACTERIAL CONJUNCTIVITIS OF LEFT EYE: Primary | ICD-10-CM

## 2023-06-23 PROCEDURE — 99203 OFFICE O/P NEW LOW 30 MIN: CPT | Performed by: STUDENT IN AN ORGANIZED HEALTH CARE EDUCATION/TRAINING PROGRAM

## 2023-06-23 RX ORDER — SEMAGLUTIDE 0.5 MG/.5ML
INJECTION, SOLUTION SUBCUTANEOUS
COMMUNITY
Start: 2023-05-11

## 2023-06-23 RX ORDER — ERYTHROMYCIN 5 MG/G
0.5 OINTMENT OPHTHALMIC 4 TIMES DAILY
Qty: 14 G | Refills: 0 | Status: SHIPPED | OUTPATIENT
Start: 2023-06-23 | End: 2023-06-30

## 2023-06-23 NOTE — PROGRESS NOTES
Assessment & Plan     Bacterial conjunctivitis of left eye  Exposed to bacterial conjunctivitis by grandchild. Erythema and discharge in the left eye. Will plan for treatment with erythromycin   - erythromycin (ROMYCIN) 5 MG/GM ophthalmic ointment  Dispense: 14 g; Refill: 0     10 minutes spent by me on the date of the encounter doing chart review, history and exam, documentation and further activities per the note. Billed based on complexity.    No follow-ups on file.    Madeline Harris MD  [facility]    Franklin Vallecillo is a 48 year old female who presents to clinic today for the following health issues:  Chief Complaint   Patient presents with     Eye Problem     Left eye started being red overnight. Started watering and woke up this morning with eyes closed. Grandkids had similar 2 weeks ago and they were over. Itchy. Does have cold sx. Used warm washcloth and helped minimally     HPI    Pink eye watery and woke up with it crusted. Started last night. Grandchild with it a few weeks ago.   Eye Problem    Onset of symptoms was <1 day(s) ago.   Location: left eye   Course of illness is worsening.    Severity moderate  Current and Associated symptoms: discharge, mattering, eyelid swelling, itching  Treatment measures tried include warm packs  Context: Pink eye exposure and Recent URI    Review of Systems  Constitutional, HEENT, cardiovascular, pulmonary, gi and gu systems are negative, except as otherwise noted.      Objective    /81 (BP Location: Right arm, Patient Position: Sitting, Cuff Size: Adult Regular)   Pulse 71   Temp 98.5  F (36.9  C) (Tympanic)   Resp 16   SpO2 98%   Physical Exam   GENERAL: healthy, alert and no distress  EYES: PERRL, EOMI, conjunctiva/corneas- conjunctival injection OS and yellow colored discharge present left  NECK: no adenopathy, no asymmetry, masses, or scars  SKIN: no suspicious lesions or rashes

## 2023-07-02 ENCOUNTER — HEALTH MAINTENANCE LETTER (OUTPATIENT)
Age: 48
End: 2023-07-02

## 2024-03-13 ENCOUNTER — ANCILLARY PROCEDURE (OUTPATIENT)
Dept: GENERAL RADIOLOGY | Facility: CLINIC | Age: 49
End: 2024-03-13
Payer: COMMERCIAL

## 2024-03-13 ENCOUNTER — OFFICE VISIT (OUTPATIENT)
Dept: URGENT CARE | Facility: URGENT CARE | Age: 49
End: 2024-03-13
Payer: COMMERCIAL

## 2024-03-13 VITALS
WEIGHT: 179 LBS | DIASTOLIC BLOOD PRESSURE: 74 MMHG | OXYGEN SATURATION: 99 % | SYSTOLIC BLOOD PRESSURE: 114 MMHG | HEART RATE: 76 BPM | TEMPERATURE: 96.6 F | RESPIRATION RATE: 20 BRPM | BODY MASS INDEX: 29.38 KG/M2

## 2024-03-13 DIAGNOSIS — S22.32XA CLOSED FRACTURE OF ONE RIB OF LEFT SIDE, INITIAL ENCOUNTER: Primary | ICD-10-CM

## 2024-03-13 DIAGNOSIS — R07.81 RIB PAIN ON LEFT SIDE: ICD-10-CM

## 2024-03-13 PROCEDURE — 99214 OFFICE O/P EST MOD 30 MIN: CPT

## 2024-03-13 PROCEDURE — 71101 X-RAY EXAM UNILAT RIBS/CHEST: CPT | Mod: TC | Performed by: RADIOLOGY

## 2024-03-13 RX ORDER — CYCLOBENZAPRINE HCL 10 MG
10 TABLET ORAL 3 TIMES DAILY PRN
Qty: 30 TABLET | Refills: 0 | Status: SHIPPED | OUTPATIENT
Start: 2024-03-13

## 2024-03-13 RX ORDER — KETOROLAC TROMETHAMINE 10 MG/1
10 TABLET, FILM COATED ORAL EVERY 6 HOURS PRN
Qty: 20 TABLET | Refills: 0 | Status: CANCELLED | OUTPATIENT
Start: 2024-03-13

## 2024-03-13 RX ORDER — HYDROCODONE BITARTRATE AND ACETAMINOPHEN 5; 325 MG/1; MG/1
1 TABLET ORAL EVERY 6 HOURS PRN
Qty: 10 TABLET | Refills: 0 | Status: SHIPPED | OUTPATIENT
Start: 2024-03-13 | End: 2024-03-16

## 2024-03-13 ASSESSMENT — PAIN SCALES - GENERAL: PAINLEVEL: MODERATE PAIN (4)

## 2024-03-13 NOTE — PATIENT INSTRUCTIONS
Diagnosis: Rib Fracture (Broken Rib) o  You broke your 5th rib   Rib fractures can be very painful because your ribs move when you breathe, cough, and move your upper body.     Today we did:  Xray: showed a fracture of the 5th rib on the left   Minimally displaced      Plan:   Rib fractures don't need a cast like other bones.   On average they take 6 weeks to heal, sometimes 8 weeks. The first 3 to 4 weeks will be the most painful.   Ibuprofen or tylenol   No narcotics have a tendency to decrease the drive to breath, specifically bad with rib fractures.   Important to: do deep breathing, don't avoid b/c of pain   If coughing, or changing position from sitting to lying down, may cause the broken ends to move slightly, hug a pillow and brace/splint   No Rib belts or bandages around chest  have a tendency to decrease the drive to breath and cause pneumonia in rib fractures   These were used in the past but impair your ability to breathe and cough and increase the risk of developing pneumonia.   It is important to stay active. DO NOT rest in bed all day. However, you should avoid any heavy lifting or strenuous exertion until your pain improves.  Use ice and heat     It hurts to breathe when you have a broken rib. This puts you at risk of getting pneumonia from poor airflow through your lungs. To prevent this:   Take your incentive spirometer from the hospital home with you and use it at least 4 times a day, To use properly, inspire slowly over 5 seconds and try to get the ball as high as you can. Hold your breath afterwards and exhale slowly. This exercise builds up pressure inside the lung and prevents collapse of the small air sacs of the lung. This exercise may cause some pain at the site of injury. This is normal.   Other options are to take several very deep breaths once an hour while you're awake. Breathe out through pursed lips as if you are blowing up a balloon. If possible, actually blow up a balloon or a  rubber glove.   Monitor for:   Increasing chest pain with breathing   Shortness of breath   Fever or chills   Congested cough, nausea, or vomiting   Coughing up blood   Dizziness, weakness or fainting   Or other concerning symptoms

## 2024-03-13 NOTE — PROGRESS NOTES
URGENT CARE  Assessment & Plan   Assessment:   Ruht Ann Farias is a 48 year old female who's clinical presentation today is consistent with:   1. Closed fracture of one rib of left side, initial encounter  - XR Ribs & Chest Left G/E 3 Views; Future  - Orthopedic  Referral; Future  - cyclobenzaprine (FLEXERIL) 10 MG tablet;  - HYDROcodone-acetaminophen (NORCO) 5-325 MG tablet;  Plan:  Radiologic films showed a fracture of the fifth left rib, will treat patient at this time symptomatically and supportively, this will include encouraging: using pain medication and muscle relaxants  to help decrease pain and inflammation, resting, applying ice and or heat as needed   Discussed importance of deep breathing and staying active to prevent pneumonia during acute rib pain, but that rest is important as well, discussed that ribs take 4-6 weeks to heal and no belts or immobilization/constriction is recommended, and the importance of monitoring for improvement.   Referral placed to orthopedics and recommend patient follow-up for further evaluation and treatment in the next week, additionally we discussed return precautions and if symptoms worsen patient is recommended to follow-up in emergency department immediately.  No alarm signs or symptoms present   Differential Diagnoses for this patient's chief complaint that I considered include:  fracture, dislocation, Pneumothorax, pulmonary embolism, pleural effusion, Ligamentous vs tendon pathology, musculoskeletal injury, soft tissue injury     Patient is} agreeable to treatment plan and state they will follow-up if symptoms do not improve and/or if symptoms worsen   see patient's AVS 'monitor for' section for specific patient instructions given and discussed regarding what to watch for and when to follow up    DEJAN Mistry [facility] [place]      ______________________________________________________________________      Subjective     HPI:  Ruth Ann Farias  is a 48 year old  female who presents today for evaluation the following concerns:   Patient presents with left sided Rib pain which started 5 days ago    Patient states this is acute condition.   patient states her  lifted her up in an attempt to crack her back and felt terrible back pain in the left  of her back.    Patient endorses pain that is not  radiating down her arm- left   Patient denies   associated symptoms including difficulty breathing or shortness of breath., weakness , numbness or tingling.  But patient does endorse she does not want to take big deep breaths as that will cause pain.  Patient also endorses pain with coughing  Patient denies any changes to range of motion in the left extremity, however she does not want to move left upper extremity and is resistant due to the left rib pain it causes  Patient denies any redness, swelling, bruising at the site.    Review of Systems:  Pertinent review of systems as reflected in HPI, otherwise negative.     Objective    Physical Exam:  Vitals:    03/13/24 1058   BP: 114/74   BP Location: Right arm   Patient Position: Sitting   Cuff Size: Adult Regular   Pulse: 76   Resp: 20   Temp: (!) 96.6  F (35.9  C)   TempSrc: Tympanic   SpO2: 99%   Weight: 81.2 kg (179 lb)      General:  alert and oriented, no acute distress, non-ill-appearing   Vital signs reviewed: afebrile and normotensive     SKIN: intact   NECK: supple, full ROM              No lymphadenopathy present   LUNG: normal work of breathing, good respiratory effort without retractions, good air  movement, non labored, inspection reveals normal chest expansion w/ inspiration   CV: normal S1 and S2.  ABDOMEN:  soft, non-tender, non-distended   MSK:   Ribs left posterior aspect:   no erythremia, ecchymosis, bruising, or inflammation present   Exaggerated tenderness/pain elicited with light palpation   slight decreased range of motion  in LUE due to pain and resistance not inability    Upper extremity  strength wnl   Temperature equal to body temperature.    No crepitus, no gross deformity, skin intact, and no laceration(s) present.   No numbness or tingling present    Imaging:   All images were personally read by this provider (myself).   Per my independent interpretation the xray shows minimally displaced left fifth rib fracture, no signs of pneumothorax or pleural effusion    ______________________________________________________________________    I explained my diagnostic considerations and recommendations to the patient, who voiced understanding and agreement with the treatment plan.   All questions were answered.   We discussed potential side effects, risks and benefits of any prescribed or recommended therapies, as well as expectations for response to treatments.  Please see AVS for any patient instructions & handouts given.   Patient was advised to contact the Nurse Care Line, their Primary Care provider, Urgent Care, or the Emergency Department if there are new or worsening symptoms, or call 911 for emergencies.

## 2024-03-20 ENCOUNTER — OFFICE VISIT (OUTPATIENT)
Dept: PHYSICAL MEDICINE AND REHAB | Facility: CLINIC | Age: 49
End: 2024-03-20
Payer: COMMERCIAL

## 2024-03-20 DIAGNOSIS — S22.32XA CLOSED FRACTURE OF ONE RIB OF LEFT SIDE, INITIAL ENCOUNTER: ICD-10-CM

## 2024-03-20 PROCEDURE — 99207 PR NON-BILLABLE SERV PER CHARTING: CPT | Performed by: NURSE PRACTITIONER

## 2024-03-20 NOTE — LETTER
3/20/2024         RE: Ruth Ann Farias  [address] [place] [id number]        Dear Colleague,    Thank you for referring your patient, Ruth Ann Farias, to the [facility]. Please see a copy of my visit note below.    Patient not seen    Again, thank you for allowing me to participate in the care of your patient.        Sincerely,        DEJAN Jerez CNP

## 2024-06-16 ENCOUNTER — HEALTH MAINTENANCE LETTER (OUTPATIENT)
Age: 49
End: 2024-06-16

## 2024-11-23 ENCOUNTER — OFFICE VISIT (OUTPATIENT)
Dept: URGENT CARE | Facility: URGENT CARE | Age: 49
End: 2024-11-23
Payer: COMMERCIAL

## 2024-11-23 VITALS
SYSTOLIC BLOOD PRESSURE: 116 MMHG | RESPIRATION RATE: 16 BRPM | HEART RATE: 88 BPM | BODY MASS INDEX: 27.41 KG/M2 | WEIGHT: 167 LBS | OXYGEN SATURATION: 99 % | TEMPERATURE: 97.6 F | DIASTOLIC BLOOD PRESSURE: 83 MMHG

## 2024-11-23 DIAGNOSIS — L08.9 LOCAL INFECTION OF SKIN AND SUBCUTANEOUS TISSUE: Primary | ICD-10-CM

## 2024-11-23 PROCEDURE — 99213 OFFICE O/P EST LOW 20 MIN: CPT | Performed by: NURSE PRACTITIONER

## 2024-11-23 RX ORDER — ESCITALOPRAM OXALATE 10 MG/1
1 TABLET ORAL DAILY
COMMUNITY
Start: 2024-11-05

## 2024-11-23 RX ORDER — SULFAMETHOXAZOLE AND TRIMETHOPRIM 800; 160 MG/1; MG/1
1 TABLET ORAL 2 TIMES DAILY
Qty: 10 TABLET | Refills: 0 | Status: SHIPPED | OUTPATIENT
Start: 2024-11-23 | End: 2024-11-28

## 2024-11-23 RX ORDER — LISDEXAMFETAMINE DIMESYLATE 40 MG/1
40 CAPSULE ORAL EVERY MORNING
COMMUNITY
Start: 2024-11-05

## 2024-11-23 RX ORDER — LINACLOTIDE 145 UG/1
145 CAPSULE, GELATIN COATED ORAL EVERY 24 HOURS
COMMUNITY
Start: 2024-08-30

## 2024-11-23 NOTE — PATIENT INSTRUCTIONS
1) Take antibiotic as prescribed. Take this medication with food to avoid stomach upset.   2) Ibuprofen or Tylenol as needed for fever or pain.  3) Follow up in 3-5 days if not improving, sooner if worsening or other concerns.

## 2025-05-11 ENCOUNTER — OFFICE VISIT (OUTPATIENT)
Dept: URGENT CARE | Facility: URGENT CARE | Age: 50
End: 2025-05-11
Payer: COMMERCIAL

## 2025-05-11 VITALS
TEMPERATURE: 97.5 F | RESPIRATION RATE: 16 BRPM | HEIGHT: 65 IN | WEIGHT: 164 LBS | OXYGEN SATURATION: 97 % | DIASTOLIC BLOOD PRESSURE: 96 MMHG | SYSTOLIC BLOOD PRESSURE: 103 MMHG | BODY MASS INDEX: 27.32 KG/M2 | HEART RATE: 73 BPM

## 2025-05-11 DIAGNOSIS — B37.0 OROPHARYNGEAL CANDIDIASIS: Primary | ICD-10-CM

## 2025-05-11 PROCEDURE — 99213 OFFICE O/P EST LOW 20 MIN: CPT

## 2025-05-11 RX ORDER — NYSTATIN 100000 [USP'U]/ML
500000 SUSPENSION ORAL 3 TIMES DAILY
Qty: 473 ML | Refills: 0 | Status: SHIPPED | OUTPATIENT
Start: 2025-05-11

## 2025-05-11 NOTE — PATIENT INSTRUCTIONS
Candida Infection: Thrush  Thrush is a fungal infection in the mouth and throat. Thrush doesn't usually affect healthy adults. It's more common in people with a weak immune system. It's also more likely if you take antibiotics. Thrush is normally not contagious.   Understanding fungus in the mouth and throat  Your mouth and throat normally contain millions of tiny organisms. These include bacteria and yeasts. Many of these don't cause any problems. In fact, they may help fight disease.   Yeasts are a type of fungus. A type of yeast called Candida normally lives on the membranes of your mouth and throat. It also lives in the digestive tract and on your skin. Usually, this yeast grows only in small amounts and is harmless. But in some cases, Candida can grow out of control and cause thrush. Thrush is related to other kinds of Candida infections that can occur at other parts of the body. Thrush refers to an infection of only the mouth and throat.   What causes thrush?  Thrush happens when something lets too much Candida grow inside your mouth and throat. Certain things that change the normal balance of organisms in the mouth can lead to thrush. One example is antibiotic medicine. This medicine may kill some of the normal bacteria in your mouth. Candida can then grow freely. People on antibiotics have an increased risk for thrush.   You have a higher risk for thrush if you:  Wear dentures  Are getting chemotherapy or radiation therapy  Have diabetes  Have a transplanted organ  Use corticosteroids, including inhaled corticosteroids for lung disease  Have a weak immune system, such as from HIV infection or AIDS  Are an older adult  Symptoms   A dry, cottony feeling in your mouth  Cracking at the corners of the mouth  Loss of taste  Pain while eating or swallowing  White patches on the tongue and around the sides of the mouth  Treatment   Thrush is usually treated with antifungal medicine. For mild cases, the medicine is  "often applied directly in your mouth and throat. This may be in the form of a  swish and swallow  medicine or an antifungal lozenge to suck on and dissolve in your mouth.   In more extensive cases, or if you have a weakened immune system, you may instead be treated with an antifungal pill. This can be a stronger treatment than a \"swish and swallow\" or lozenge antifungal. Or you may need medicine through an IV (intravenous) line. These treatments depend on how severe your infection is, and what other health conditions you have.   If you are at high risk for thrush, you may need to keep taking oral antifungal medicine. This is to help prevent thrush in the future.     *Plan B, oral Fluconazole but can lead to gastrointestinal distress, headache, cutaneous eruptions, and liver function test abnormalities. Uncommon serious side effects include hepatoxicity, severe drug hypersensitivity reactions (including Hahn-Abdullahi syndrome), anaphylaxis, Torsades de pointes, and prolongation of the QT interval.     What happens if you don t get treated for thrush?  If untreated, the Candida may make it difficult to eat or drink. Or it can spread to the esophagus and rarely to other parts your body.   Preventing   Practice good oral hygiene.  Clean your dentures regularly as instructed. Make sure they fit you correctly.  After using a corticosteroid inhaler, rinse out your mouth with water or mouthwash.  Don't use broad-spectrum antibiotics, if possible.  Get treated for health problems that increase your risk for thrush, such as diabetes or HIV.    "

## 2025-05-11 NOTE — PROGRESS NOTES
Urgent Care Clinic Visit    Chief Complaint   Patient presents with    Mouth/Lip Problem     White tongue and a little sore x 3 days               5/11/2025    10:15 AM   Additional Questions   Roomed by Kelly BOJORQUEZ

## 2025-05-11 NOTE — PROGRESS NOTES
URGENT CARE  Assessment & Plan   Assessment:   Ruth Ann Farias is a 49 year old female who's clinical presentation today is consistent with:   1. Oropharyngeal candidiasis    - nystatin (MYCOSTATIN) 587164 UNIT/ML suspension;    Plan:  Will treat patient's suspected oropharyngeal candidiasis today with antifungal medications,  Additionally we discussed if symptoms do not improve after starting today's treatment to follow up in 10-14 days, sooner if symptoms worsen, return precautions given  No alarm signs or symptoms present   Differential Diagnoses for this patient's chief complaint that I considered include:  Halitosis, glossitis, Hairy leukoplakia, plaque lichen planus, Oral / tongue lesion, hypertrophic papillae, Thermal burn, cheilitis, geographical tongue, Somatists, parafunctional habits, poor dentition, migratory glossitis, Oral reactive/frictional keratosis    DEJAN Tineo [facility] URGENT CARE [place]      ______________________________________________________________________      Subjective     HPI: Ruth Ann Farias  is a 49 year old  female who presents today for evaluation the following concerns:   Patient endorses a white plaque and cracked sore spot on her tongue today which started 3 days ago    Patient reports soreness, feelings of dry mouth, noticing a thick adherent substance to her tongue and some slight pain with eating and at rest. Patient states they have  had this before. Patient states they are not immunocompromised, but uses inhalers daily and forgets to rinse out her mouth   Patient denies any fevers, sweats, chills, myalgias, wheezing, shortness of breath, difficulty breathing, chest pain, weakness or signs of dehydration. Patient denies any difficulty eating, drinking or swallowing. Patient denies any swelling of the mouth or face.     Review of Systems:  Pertinent review of systems as reflected in HPI, otherwise negative.     Objective    Physical Exam:  Vitals:  "   05/11/25 1016   BP: (!) 103/96   BP Location: Right arm   Patient Position: Sitting   Cuff Size: Adult Regular   Pulse: 73   Resp: 16   Temp: 97.5  F (36.4  C)   TempSrc: Tympanic   SpO2: 97%   Weight: 74.4 kg (164 lb)   Height: 1.651 m (5' 5\")      General: Alert and oriented, no acute distress, Vital signs reviewed: afebrile,  normotensive   EYES: Conjunctiva: Clear bilaterally, no injection or erythema present  MOUTH/THROAT: lips appear normal upon inspection                tongue, & oral mucosa:      Small cracked fissure to anterior middle of tongue   Plaque noted on anterior distal aspect of tongue     Plaque is white in appearance    Posterior oropharynx is without erythremia, exudate, lesions or tonsillar  Edema, no dysphonia, uvula normal   LUNG: normal work of breathing, good respiratory effort without retractions, good air  movement, non labored, inspection reveals normal chest expansion w/  inspiration   ______________________________________________________________________    I explained my diagnostic considerations and recommendations to the patient  All questions were answered.   Please see AVS for any patient instructions & handouts given.   "

## 2025-08-31 ENCOUNTER — HOSPITAL ENCOUNTER (EMERGENCY)
Facility: CLINIC | Age: 50
Discharge: HOME | End: 2025-08-31
Attending: NURSE PRACTITIONER | Admitting: NURSE PRACTITIONER
Payer: COMMERCIAL

## 2025-08-31 VITALS
HEART RATE: 76 BPM | OXYGEN SATURATION: 99 % | TEMPERATURE: 97.5 F | DIASTOLIC BLOOD PRESSURE: 76 MMHG | BODY MASS INDEX: 27.46 KG/M2 | RESPIRATION RATE: 20 BRPM | WEIGHT: 165 LBS | SYSTOLIC BLOOD PRESSURE: 109 MMHG

## 2025-08-31 DIAGNOSIS — H18.822 CORNEAL ABRASION OF LEFT EYE DUE TO CONTACT LENS: Primary | ICD-10-CM

## 2025-08-31 PROCEDURE — 99213 OFFICE O/P EST LOW 20 MIN: CPT | Performed by: NURSE PRACTITIONER

## 2025-08-31 PROCEDURE — G0463 HOSPITAL OUTPT CLINIC VISIT: HCPCS | Performed by: NURSE PRACTITIONER

## 2025-08-31 RX ORDER — CIPROFLOXACIN HYDROCHLORIDE 3.5 MG/ML
1 SOLUTION/ DROPS TOPICAL EVERY 4 HOURS
Qty: 5 ML | Refills: 0 | Status: SHIPPED | OUTPATIENT
Start: 2025-08-31 | End: 2025-09-07

## 2025-08-31 ASSESSMENT — COLUMBIA-SUICIDE SEVERITY RATING SCALE - C-SSRS
2. HAVE YOU ACTUALLY HAD ANY THOUGHTS OF KILLING YOURSELF IN THE PAST MONTH?: NO
6. HAVE YOU EVER DONE ANYTHING, STARTED TO DO ANYTHING, OR PREPARED TO DO ANYTHING TO END YOUR LIFE?: NO
1. IN THE PAST MONTH, HAVE YOU WISHED YOU WERE DEAD OR WISHED YOU COULD GO TO SLEEP AND NOT WAKE UP?: NO

## 2025-08-31 ASSESSMENT — VISUAL ACUITY
OS: 20/40
OD: 20/40